# Patient Record
Sex: MALE | Race: WHITE | Employment: FULL TIME | URBAN - METROPOLITAN AREA
[De-identification: names, ages, dates, MRNs, and addresses within clinical notes are randomized per-mention and may not be internally consistent; named-entity substitution may affect disease eponyms.]

---

## 2021-04-06 ENCOUNTER — OFFICE VISIT (OUTPATIENT)
Dept: ORTHOPEDIC SURGERY | Age: 41
End: 2021-04-06
Payer: COMMERCIAL

## 2021-04-06 DIAGNOSIS — M76.52 PATELLAR TENDONITIS OF LEFT KNEE: ICD-10-CM

## 2021-04-06 DIAGNOSIS — M17.12 PRIMARY OSTEOARTHRITIS OF LEFT KNEE: Primary | ICD-10-CM

## 2021-04-06 DIAGNOSIS — M25.562 LEFT KNEE PAIN, UNSPECIFIED CHRONICITY: ICD-10-CM

## 2021-04-06 DIAGNOSIS — G80.1 SPASTIC DIPLEGIC CEREBRAL PALSY (HCC): ICD-10-CM

## 2021-04-06 PROCEDURE — 20610 DRAIN/INJ JOINT/BURSA W/O US: CPT | Performed by: ORTHOPAEDIC SURGERY

## 2021-04-06 PROCEDURE — 99204 OFFICE O/P NEW MOD 45 MIN: CPT | Performed by: ORTHOPAEDIC SURGERY

## 2021-04-06 RX ORDER — METHYLPREDNISOLONE ACETATE 40 MG/ML
40 INJECTION, SUSPENSION INTRA-ARTICULAR; INTRALESIONAL; INTRAMUSCULAR; SOFT TISSUE ONCE
Status: COMPLETED | OUTPATIENT
Start: 2021-04-06 | End: 2021-04-06

## 2021-04-06 RX ADMIN — METHYLPREDNISOLONE ACETATE 40 MG: 40 INJECTION, SUSPENSION INTRA-ARTICULAR; INTRALESIONAL; INTRAMUSCULAR; SOFT TISSUE at 17:37

## 2021-04-06 NOTE — PROGRESS NOTES
4/6/21 3:54 PM     Lidocaine Injection      NDC: [address]-99    Lot Number: [id number]    Body Part: left knee

## 2021-04-06 NOTE — PROGRESS NOTES
Date:  2021    Name:  Renard Salas  Address:  [address] [place] [address]  Yuni Aburto    :  1980      Age:   36 y.o.    SSN:        Medical Record Number:  <[id number]>    Reason for Visit:    Chief Complaint    Knee Pain (old patient / new problem left knee. pain for a couple of months. no injury )      DOS:2021     HPI: Mireya Del Rosario is a 36 y.o. male here today for evaluation of left knee pain that has been ongoing for 2-3 months. He does not recall any recent injuries but does note a fall about a year ago. He does have a history of cerebral palsy. He presents today complaining of medial and lateral aching pain as well as a burning pain in his patellar tendon. Pain is exacerbated with stairs and extension. Denies catching or locking but endorses popping and clicking. He is ok sleeping at night. He has tried advil with no improvement. He has also been to physical therapy for his knees in the last few months with minimal improvement on the left knee. He has a history of previous arthroscopies 15-20 years ago. ROS: Review of systems reviewed from Patient History Form completed today and available in the patient's chart under the Media tab. Past Medical History:   Diagnosis Date    Cerebral palsy [facility])         Past Surgical History:   Procedure Laterality Date    KNEE SURGERY Right     x3    KNEE SURGERY Left     x4       No family history on file.     Social History     Socioeconomic History    Marital status: Single     Spouse name: Not on file    Number of children: Not on file    Years of education: Not on file    Highest education level: Not on file   Occupational History    Not on file   Social Needs    Financial resource strain: Not on file    Food insecurity     Worry: Not on file     Inability: Not on file    Transportation needs     Medical: Not on file     Non-medical: Not on file   Tobacco Use    Smoking status: Never Smoker   Substance and Sexual Activity    Alcohol use: Not on file    Drug use: Not on file    Sexual activity: Not on file   Lifestyle    Physical activity     Days per week: Not on file     Minutes per session: Not on file    Stress: Not on file   Relationships    Social connections     Talks on phone: Not on file     Gets together: Not on file     Attends Orthodox service: Not on file     Active member of club or organization: Not on file     Attends meetings of clubs or organizations: Not on file     Relationship status: Not on file    Intimate partner violence     Fear of current or ex partner: Not on file     Emotionally abused: Not on file     Physically abused: Not on file     Forced sexual activity: Not on file   Other Topics Concern    Not on file   Social History Narrative    Not on file       No current outpatient medications on file. No current facility-administered medications for this visit. No Known Allergies    Vital signs: There were no vitals taken for this visit. LEFT Knee Exam: 0-90, moderate effusion, patella crepitus    Gait: No use of assistive devices. No antalgic gait. Alignment: Normal alignment. Inspection/skin: Skin is intact without erythema or ecchymosis. No gross deformity. Palpation: Patella crepitus. No joint line tenderness present. Range of Motion: 0 - 90 degrees of flexion. Strength: Normal quadriceps development. Effusion: Moderate effusion. Ligamentous stability: No cruciate or collateral ligament instability. Neurologic and vascular: Skin is warm and well-perfused. Sensation is intact to light-touch. Special tests: Negative Julieta sign. RIGHT Knee Exam:    Gait: No use of assistive devices. No antalgic gait. Alignment: Normal alignment. Inspection/skin: Skin is intact without erythema or ecchymosis. No gross deformity. Healed surgical incision. Palpation: No crepitus. No joint line tenderness present.     Range of Motion: There is full range of motion. Strength: Normal quadriceps development. Effusion: No effusion or swelling present. Ligamentous stability: No cruciate or collateral ligament instability. Neurologic and vascular: Skin is warm and well-perfused. Sensation is intact to light-touch. Special tests: Negative Julieta sign. Diagnostics:  Radiology:     Pertinent imaging was interpreted and reviewed with the patient. Radiographs were obtained, interpreted, and reviewed with the patient in the office; 4 views: bilateral PA, bilateral Goss, bilateral Merchants AND left lateral    Impression: Degenerative changes. No acute bony abnormalities appreciated on radiographic examination. Assessment: Osteoarthritis of left knee with patellar tendonitis; Spastic diplegic cerebral palsy    Plan: The nature and natural history of osteoarthritis was discussed in detail the patient today. Treatment options both surgical and nonsurgical were discussed in detail. Patient was counseled with regard to the importance of activity modification as well as weight control. The role for medications, intra-articular injections as well as surgery were discussed. Patient's questions were answered.     Believe patient is a candidate for an intraarticular cortisone injection for his left knee pain and inflammation as well as topical Voltaren gel. He wishes to proceed. He has a history of a stomach ulcer so we will try to stay away from oral anti-inflammatories. He would also like to try formal, supervised physical therapy with our therapists here at [facility]. The indications and risks of steroid injection as well as treatment alternatives were discussed with the patient who consented to the procedure. Under sterile conditions and after informed consent was obtained the patient was given an injection into the LEFT knee.  2cc 40 mg of Depo-Medrol and 4 mL of 1% lidocaine were placed in the knee after it was prepped with chlorhexidine. This resulted in good relief of symptoms. There were no complications. The patient was advised to ice the knee this evening and to avoid vigorous activities for the next 2 days. They were advised to call us if there was any erythema, enduration, swelling or increasing pain. I will see him back in 1 month, sooner if symptoms worsen. Panfilo Olguin is in agreement with this plan. All questions were answered to patient's satisfaction and was encouraged to call with any further questions. Orders Placed This Encounter   Procedures    XR KNEE LEFT (MIN 4 VIEWS)     Standing Status:   Future     Number of Occurrences:   1     Standing Expiration Date:   4/6/2022     Order Specific Question:   Reason for exam:     Answer:   knee pain    XR KNEE RIGHT (3 VIEWS)     Standing Status:   Future     Number of Occurrences:   1     Standing Expiration Date:   4/6/2022     Order Specific Question:   Reason for exam:     Answer:   knee pain    Ambulatory referral to Physical Therapy     Referral Priority:   Routine     Referral Type:   Eval and Treat     Referral Reason:   Specialty Services Required     Requested Specialty:   Physical Therapy     Number of Visits Requested:   1         Total time spent for evaluation, education and development of treatment plan: 50 minutes      Martin MONIQUE ATC, am scribing for and in the presence of Dr. Rajiv Jara. 04/06/21 4:07 PM Martin Chan ATC        I attest that I met personally with the patient, performed the described exam, reviewed the radiographic studies and medical records associated with this patient and supervised the services that are described above.      Jose G Muniz MD

## 2021-04-06 NOTE — LETTER
PRESCRIPTION FOR PHYSICAL THERAPY    Patient Name: Thierno Gay MRN: <[id number]>  DOS: 4/6/2021   Diagnosis:   1. Primary osteoarthritis of left knee    2. Spastic diplegic cerebral palsy (HCC)    3. Patellar tendonitis of left knee    4. Left knee pain, unspecified chronicity                           Surgical Procedure:          Surgical Date:   Goal:  [x]Decrease Pain and/or Swelling [x]Increase ROM and/or Flexibility     []Increase Function                           [x]Increase Strength and/or Endurance   []Other   Evaluation:  [x]Evaluation and Treatment []KT-1000   []Isokinetic Exam   []Preoperative Eval    Recommended Modalities:  [x]Modalities of Choice      []HCVS            []Electrical Stimulation     [] Remove Dressing  []Ultrasound        []TENS/TNS    [] Lumbar Traction           [] Cervical Traction   []Phonophoresis         []Hot Pack/Cold Pack   []PT Treatment, Unlisted []Other:  Therapeutic Exercises:    []Isometrics    []Range of Motion []Progressive Exer. []Balance Coordination   []Flexibility  []ROM Limited  []Total Hip Replacement   []Passive  []ROM Full   []Total Knee Replacement  []Active Assisted    []Shoulder Impingement Prog  []Active   []Tennis Elbow Program   []Capsular Shift Regular        []Isokinetics      []Spine Program   []Straight Leg Raises  [] Gait    []Fixation                   [] Supine                                              [] Running   [] Extension   [] Prone   [] Throwing   [] Stabilization   [] AB    [] Swiss Laverta Clonts   [] AD      [] Spine Eval   [] Cervical Eval  [] Conditioning   [] Lumbar  [] Stationary Bike   [] [place] Track   [] Lumbar Exer.  [] Stairmaster         [] Treadmill   [] Functional Cap [] Aquatic Prog.      [] Return to work    Treatment Program:  Frequency: [] 1x  [x] 2x  [] 3x  [] 4x  [] 5x week/month  Duration: [] 1  [] 2  [] 3  [x] 4  [] 5 week/month  Weight Bearing: [] Non  [] 1/4  [] 1/2  [] 3/4  [] Full  ROM: [] Restricted  [] Full  [] Follow established:        [] Other: